# Patient Record
(demographics unavailable — no encounter records)

---

## 2017-09-27 NOTE — PN
Delivery





- Delivery


Vaginal Delivery: No Problems, Spontaneous (cord around neckx1, clamped & cut 

before delivery of shoulder)


Type of Anesthesia: Local


Episiotomy/Laceration: Midline (sutured in layers with chr catgut #2/0 in 

layers . pr exam mucosa & sphincter intact)


EBL (cc): 350





Delivery, Single Birth





- Stages of Labor


Date 1st Stage Initiatied: 17


Time 1st Stage Initiated: 04:00


Date 2nd Stage Initiated: 17


Time 2nd Stage Initiated: 08:45


Date of Delivery: 17


Time of Delivery: 08:50


Time Placenta Delivered: 09:00


Placenta: Yes: Spontaneous, Uterine Exploration





- Condition of Infant


Pediatrician/Neonatologist Present: No


Infant Gender: Male


Birth Weight: 6 lb 7 oz


Position: Left, OA


Total Hours ROM (Hrs/Mins): 0/15





- Apgar


  ** 1 Minute


Apgar Total Score: 9





  ** 5 Minutes


Apgar Total Score: 9





- [place] Feeding Plan


Initial Plan: Exclusive breastfeeding throughout hospitalization





Remarks





- Remarks


Remarks: 


19 yrs  , 38.5/7 weeks by chris & 38.1/7 weeks by adela , GBS neg 


pnc at [address] [place] [address] 


intrapartum stadol 1 mg + phenergan 25 mg  iv for labor analgesia was given 


Intrapartum course was uneventful

## 2017-09-27 NOTE — HP
Past Medical History





- Primary Care Physician


PCP:: Sigrid Wan





- Admission


Chief Complaint: 19 yrs  38.1/7 weeks by sono, 38.5/7 weeks by dates 

admitted in labor .  onset Lp since 4.00AM


History of Present Illness: 


prenatal care [facility] fr[facility] to , [facility] 


pt has multiple visits for in L&D for labor asses 


Last son on 9/15/17 35.4/7 weeks, BPP 8/8, ARLET 14.2, EFW 5'15' 


h/o anemia , 36 weeks h/h 9.7/34.1, Plt 175, GBs neg, Hiv neg, , gc/ct neg .


Prenatal panel , : at [facility] on 3/27/17 Hbsag neg, Rpr nr, hiv neg, rubella 

immune, 


1 hr Gtt 7/3/17 145, 1017 3 hr GTT wnl ( 70, 117, 100,100 ) 





History Source: Patient, Medical Record


Limitations to Obtaining History: No Limitations





- Past Medical History


CNS: No: Migraine, Seizure


Cardiovascular: No: HTN, Murmur


Pulmonary: No: Asthma, COPD


Gastrointestinal: No: Gastritis


Renal/: No: UTI


...: 1


...LMP: 16


... Weeks Gestation by Dates: 38.5


...EDC by Dates: 10/05/17


[facility]38.1/7 [facility] )


Heme/Onc: Yes: Anemia


Infectious Disease: No: HIV, STD's


Psych: No: Addictions, Anxiety, Bipolar, Depression


Endocrine: No: Diabetes Insipidus, Diabetes Mellitus, Hypothyroidism





- Past Surgical History


Past Surgical History: Yes: None


Hx Myomectomy: No


Hx Transabdominal Cerclage: No





- Smoking History


Smoking history: Never smoked


Aproximately how many cigarettes per day: 0





- Alcohol/Substance Use


Hx Alcohol Use: No


History of Substance Use: reports: None





Home Medications





- Allergies


Allergies/Adverse Reactions: 


 Allergies











Allergy/AdvReac Type Severity Reaction Status Date / Time


 


No Known Allergies Allergy   Verified 17 06:14














- Home Medications


Home Medications: 


Ambulatory Orders





Ferrous Sulfate [Feosol] 325 mg PO BID 17 


Prenatal Vit No.130/Iron/FA [Prenatal Vitamins] 1 each PO DAILY 17 











Physical Exam - Maternity


Vital Signs: 


 Selected Entries











  17





  06:15


 


Temperature 98.1 F


 


Pulse Rate 64


 


Blood Pressure 125/80


 


Weight 105 lb











Constitutional: Yes: Severe Distress, Pallor


Eyes: Yes: WNL


HENT: Yes: WNL, Normocephalic


Neck: Yes: WNL


Cardiovascular: Yes: WNL, Regular Rate and Rhythm


Lungs: Clear to auscultation


Breast(s): Yes: WNL





- Abdominal Exam/OB


Fundal Height: 38


Number of Fetuses: Single


Fetal Presentation: Vertex (exam at 5.15 AM)


Contractions: Yes


Regularity: Regular


Intensity: Strong


Fetal Monitor Mode: External


Fetal Heart Rate (range): 120


Fetal Heart Rate Location: [facility]


Category: I


Accelerations: Uniform (loss of contact due to pt moving)


Decelerations: None





- Vaginal Exam/OB


Vaginal Bleediing: Bloody Show


Speculum Exam: No


Dilatation (cm): 6-7


Effacement (%): 100


Amniotic Membrane Status: Intact


Fetal Presentation: Vertex/Position


Fetal Station: -1





- Physical Exam


Musculoskeletal: Yes: WNL


Extremities: Yes: WNL.  No: Calf Tenderness


Edema: LLE: Trace, RLE: Trace


Integumentary: Yes: WNL


Deep Tendon Reflex Grade: Normal +2


...Motor Strength: WNL


Psychiatric: Yes: WNL, Alert, Oriented





- Labs


Lab Results: 


 Laboratory Tests











  17





  05:30 05:30 05:30


 


WBC  6.1  


 


RBC  3.36 L  


 


Hgb  10.2 L  


 


Hct  31.3 L  


 


Plt Count  171  


 


Neutrophils %  61.6  


 


Lymphocytes %  29.4  


 


PT with INR   10.00 


 


INR   0.91 


 


PTT (Actin FS)   28.5 


 


Sodium    137


 


Potassium    3.8


 


Chloride    103


 


Carbon Dioxide    21


 


BUN    8


 


Creatinine    0.6


 


Random Glucose    128 H


 


Calcium    9.2


 


RPR Titer   














  17





  05:30


 


WBC 


 


RBC 


 


Hgb 


 


Hct 


 


Plt Count 


 


Neutrophils % 


 


Lymphocytes % 


 


PT with INR 


 


INR 


 


PTT (Actin FS) 


 


Sodium 


 


Potassium 


 


Chloride 


 


Carbon Dioxide 


 


BUN 


 


Creatinine 


 


Random Glucose 


 


Calcium 


 


RPR Titer  Nonreactive














Problem List





- Problems


(1) Pregnancy with 38 completed weeks gestation


Code(s): Z3A.38 - 38 WEEKS GESTATION OF PREGNANCY





(2) Labor established


Code(s): VYA1580 - 





(3) Anemia


Code(s): D64.9 - ANEMIA, UNSPECIFIED   Qualifiers: 


     Anemia type: iron deficiency     Iron deficiency anemia type: inadequate 

dietary iron intake        Qualified Code(s): D50.8 - Other iron deficiency 

anemias  








Assessment/Plan


19 yrs  , 38.5/7 weeks in labor ,gbs neg 


Plan trial of labor 


       stadol + phenrgan for labor analgesia

## 2017-09-28 NOTE — DS
Physical Exam-GYN


Vital Signs: 


 Vital Signs











Temperature  98.1 F   17 13:57


 


Pulse Rate  73   17 13:57


 


Respiratory Rate  18   17 13:57


 


Blood Pressure  118/73   17 13:57


 


O2 Sat by Pulse Oximetry (%)  97   17 22:38











Constitutional: Yes: Well Nourished, Pallor


Eyes: Yes: WNL


HENT: Yes: WNL, Normocephalic


Neck: Yes: WNL


Cardiovascular: Yes: WNL, Regular Rate and Rhythm


Respiratory: Yes: WNL, CTA Bilaterally


Gastrointestinal: Yes: WNL, Normal Bowel Sounds


...Rectal Exam: Yes: WNL, Sphincter Tone Normal


Renal/: Yes: WNL


....Post Partum: Yes: Uterus firm, Uterus non-tender, Slight lochia rubra (, 

episiotomy healing), Moderate lochia rubra


Breast(s): Yes: WNL, Other (BF)


Musculoskeletal: Yes: WNL


Extremities: Yes: WNL.  No: Calf Tenderness


Edema: LLE: Trace, RLE: Trace


Neurological: Yes: WNL


...Motor Strength: WNL


Psychiatric: Yes: WNL


Labs: 


 CBC, BMP





 17 06:30 





 17 05:30 











Delivery





- Delivery


Vaginal Delivery: No Problems, Spontaneous (cord around neckx1, clamped & cut 

before delivery of shoulder)


Type of Anesthesia: Local


Episiotomy/Laceration: Midline (sutured in layers with chr catgut #2/0 in 

layers . pr exam mucosa & sphincter intact)


EBL (cc): 350





Delivery, Single Birth





- Stages of Labor


Date 1st Stage Initiatied: 17


Time 1st Stage Initiated: 04:00


Date 2nd Stage Initiated: 17


Time 2nd Stage Initiated: 08:45


Date of Delivery: 17


Time of Delivery: 08:50


Time Placenta Delivered: 09:00


Placenta: Yes: Spontaneous, Uterine Exploration





- Condition of Infant


Pediatrician/Neonatologist Present: No


Infant Gender: Male


Birth Weight: 6 lb 7 oz


Position: Left, OA


Total Hours ROM (Hrs/Mins): 0/15





- Apgar


  ** 1 Minute


Apgar Total Score: 9





  ** 5 Minutes


Apgar Total Score: 9





-  Feeding Plan


Initial Plan: Exclusive breastfeeding throughout hospitalization





Remarks





- Remarks


Remarks: 


19 yrs  , 38.5/7 weeks by chris & 38.1/7 weeks by RANI chapman neg 


pnc at [address] [place] [address] 


intrapartum stadol 1 mg + phenergan 25 mg  iv for labor analgesia was given 


Intrapartum course was uneventful 


pp course uneventful. 


Anemia acounselled 


discharge 17





Discharge Summary


Reason For Visit: LABOR ADMIT


Current Active Problems





Anemia (Acute) 


Labor established (Acute) 


Pregnancy with 38 completed weeks gestation (Acute) 


Status post vaginal delivery (Acute) 








Condition: Stable





- Instructions


Diet, Activity, Other Instructions: 


Post Partum Instructions





DIET:


Continue good diet high in protein, calcium, and iron rich foods. Drink at 

least eight (8) glasses of water daily in addition to other fluids.


ct   Regular diet











MEDICATIONS:


Continue prenatal vitamins and iron as previously directed. Motrin and Tylenol 

may be taken for minor discomfort. 





ACTIVITY:


Mild to moderate exercise may be started in two (2) weeks. Take frequent rest 

periods. Resume normal activity after six (6) week check up. 





WOUND CARE OF OPERATIVE SITE:


Continue use of perineal bottle until vaginal discharge stops. Keep area clean. 

Shower daily. Keep abdominal wound dry. Report any drainage or redness to 

physician. Tub baths, tampons and douches are not permitted for 6 weeks.





ct  Breast feeding  & or  Bottle feeding





BREAST CARE: (For those that are not breast feeding): If engorgement occurs:


Wear tight fitting bra.


Take Tylenol or Motrin for pain.


Apply cold packs (ice in bags to each breast )





FAMILY PLANNING:


There are many birth control alternatives to pursue and they should be 

discussed at your first office visit. You may resume sexual activity after your 

six (6) week check up. (Remember, breast feeding is not a contraceptive)





NEXT PHYSICIAN APPOINTMENT:


Be certain to call for a six (6) week appointment, unless otherwise directed.  





Call Clinic or got to Emergency Dept if you have any of the following:


   Heavy vaginal bleeding


   Painful urination


   Leg pain


   Unusual odor noted to vaginal bleeding


   High fever


   Red streaking noted on breast








Referrals: 


Sigrid Wan MD [Family Provider] - 


Disposition: HOME





- Home Medications


Comprehensive Discharge Medication List: 


Ambulatory Orders





Ferrous Sulfate [Feosol] 325 mg PO BID 17 


Prenatal Vit No.130/Iron/FA [Prenatal Tablet] 1 each PO DAILY 17 


Benzocaine [Americaine 20% Spray -] 1 spray TP PRN PRN #0 bottle 17 


Ferrous Sulfate [Feosol] 325 mg PO BIDWM #60 tab 17 


Ibuprofen [Motrin -] 200 mg PO Q4H PRN #0 tablet 17 


Prenatal Vitamins (Sjr) - 1 tab PO DAILY #30 tablet 17 


Witch Hazel 50% (Tucks) [Tucks Pads -] 1 pad TP PRN PRN #0 pad 17

## 2017-09-28 NOTE — PN
Post Partum Progress Note





- Subjective


Subjective: 


20 yo Para 1, status post vaginal delivery, seen and evaluated.


Doing well, no complaints.


Post Partum Day: 1


Type of Delivery: 


Vital Signs: 


 Vital Signs











Temperature  98 F   17 06:00


 


Pulse Rate  71   17 06:00


 


Respiratory Rate  18   17 06:00


 


Blood Pressure  122/71   17 06:00


 


O2 Sat by Pulse Oximetry (%)  97   17 22:38











Breast Exam: Yes: Soft


Uterus: Yes: Fundus Firm


Abdomen/GI: Yes: Abdomen soft, Tolerating PO


Lochia: Yes: Rubra


Lochia, amount: Small


Extremities: Yes: Calves non-tender


Activity: Ambulating





- Labs


Labs: 


 CBC











WBC  8.6 K/mm3 (4.0-10.0)  D 17  06:30    


 


RBC  2.58 M/mm3 (3.60-5.2)  L D 17  06:30    


 


Hgb  8.0 GM/dL (10.7-15.3)  L D 17  06:30    


 


Hct  24.2 % (32.4-45.2)  L D 17  06:30    


 


MCV  93.7 fl (80-96)   17  06:30    


 


MCH  30.9 pg (25.7-33.7)   17  06:30    


 


MCHC  33.0 g/dl (32.0-36.0)   17  06:30    


 


RDW  14.0 % (11.6-15.6)   17  06:30    


 


Plt Count  151 K/MM3 (134-434)   17  06:30    


 


MPV  8.2 fl (7.5-11.1)  D 17  06:30    


 


Neutrophils %  76.3 % (42.8-82.8)  D 17  06:30    


 


Lymphocytes %  16.1 % (8-40)  D 17  06:30    


 


Monocytes %  7.3 % (3.8-10.2)   17  06:30    


 


Eosinophils %  0.1 % (0-4.5)   17  06:30    


 


Basophils %  0.2 % (0-2.0)   17  06:30    














Problem List





- Problems


(1) Status post vaginal delivery


Code(s): LLI1504 - 








Assessment/Plan


Status post vaginal delivery


Stable


Continue routine Postpartum care

## 2018-10-09 NOTE — PDOC
History of Present Illness





- General


Chief Complaint: Cold Symptoms


Stated Complaint: FEVER


Time Seen by Provider: 10/09/18 19:14





- History of Present Illness


Initial Comments: 


20-year-old female without comorbidities presents for evaluation of sore throat 

and fever as well as body aches times one day.


10/09/18 20:06








Past History





- Past Medical History


Allergies/Adverse Reactions: 


 Allergies











Allergy/AdvReac Type Severity Reaction Status Date / Time


 


No Known Allergies Allergy   Verified 09/27/17 06:14











Home Medications: 


Ambulatory Orders





NK [No Known Home Medication]  10/09/18 








Asthma: No


Cancer: No


Cardiac Disorders: No


COPD: No


Diabetes: No


HTN: No


Seizures: No


Thyroid Disease: No





- Immunization History


Immunization Up to Date: Yes





- Suicide/Smoking/Psychosocial Hx


Smoking Status: No


Smoking History: Never smoked


Have you smoked in the past 12 months: No


Number of Cigarettes Smoked Daily: 0


Information on smoking cessation initiated: No


Hx Alcohol Use: No


Drug/Substance Use Hx: No


Substance Use Type: None


Hx Substance Use Treatment: No





**Review of Systems





- Review of Systems


Constitutional: Yes: Fever, Malaise


HEENTM: Yes: Throat Pain


Musculoskeletal: Yes: Muscle Pain


All Other Systems: Reviewed and Negative





*Physical Exam





- Vital Signs


 Last Vital Signs











Temp Pulse Resp BP Pulse Ox


 


 101.9 F H  116 H  19   104/73   100 


 


 10/09/18 19:15  10/09/18 19:15  10/09/18 19:15  10/09/18 19:15  10/09/18 19:15














- Physical Exam


Comments: 


HEAD: NC/AT


EYES: Conjuntiva clear


Ears: Canals and TM's normal


NOSE: No d/c


THROAT: Moist mucous membrances, oral pharanx injected, uvula midline


NECK: Supple without adenopathy


CARDIAC: S1 S2


LUNGS: CTA Full and Equal breath sounds


ABDOMEN: Soft NT ND


MS: Full ROM in all joints without edema 


NEUROLOGIC: No gross sensory or motor deficits, NVID


SKIN: Normal color and temperature no lesions or rashes


10/09/18 20:07








*DC/Admit/Observation/Transfer


Diagnosis at time of Disposition: 


 URI (upper respiratory infection)








- Discharge Dispostion


Disposition: HOME


Condition at time of disposition: Stable


Decision to Admit order: No





- Referrals


Referrals: 


Viri Clancy MD [Primary Care Provider] - 





- Patient Instructions


Printed Discharge Instructions:  DI for Viral Upper Respiratory Infection -- 

Adult


Additional Instructions: 


Return to the emergency room should symptoms worsen or go unresolved. Please 

follow-up with your primary care doctor in one to 2 days for further evaluation 

and treatment options. Tylenol Motrin for pain and fever as directed. Your 

fluid strep test were negative today.





- Post Discharge Activity

## 2018-10-09 NOTE — PDOC
Rapid Medical Evaluation


Time Seen by Provider: 10/09/18 19:14


Medical Evaluation: 


 Allergies











Allergy/AdvReac Type Severity Reaction Status Date / Time


 


No Known Allergies Allergy   Verified 09/27/17 06:14











10/09/18 19:14


The patient presents with a chief complaint of: fever


 I have performed a brief in-person evaluation of this patient.


 Pertinent physical exam findings: vss, 


 I have ordered the following:  tylenol 


 The patient will proceed to the ED for further evaluation.





**Discharge Disposition





- Referrals


Referrals: 


Viri Clancy MD [Primary Care Provider] - 





- Patient Instructions





- Post Discharge Activity